# Patient Record
Sex: MALE | Race: WHITE | URBAN - METROPOLITAN AREA
[De-identification: names, ages, dates, MRNs, and addresses within clinical notes are randomized per-mention and may not be internally consistent; named-entity substitution may affect disease eponyms.]

---

## 2024-02-12 ENCOUNTER — APPOINTMENT (OUTPATIENT)
Dept: GENERAL RADIOLOGY | Age: 19
End: 2024-02-12
Payer: COMMERCIAL

## 2024-02-12 ENCOUNTER — HOSPITAL ENCOUNTER (EMERGENCY)
Age: 19
Discharge: HOME | End: 2024-02-12
Payer: COMMERCIAL

## 2024-02-12 VITALS
TEMPERATURE: 98.6 F | HEART RATE: 90 BPM | WEIGHT: 195 LBS | OXYGEN SATURATION: 96 % | SYSTOLIC BLOOD PRESSURE: 150 MMHG | DIASTOLIC BLOOD PRESSURE: 80 MMHG | RESPIRATION RATE: 16 BRPM | HEIGHT: 68 IN | BODY MASS INDEX: 29.55 KG/M2

## 2024-02-12 DIAGNOSIS — Z87.19 HISTORY OF GASTROESOPHAGEAL REFLUX (GERD): ICD-10-CM

## 2024-02-12 DIAGNOSIS — R14.2 BELCHING: Primary | ICD-10-CM

## 2024-02-12 LAB
ALBUMIN SERPL-MCNC: 4.8 G/DL (ref 3.5–5)
ALBUMIN/GLOB SERPL: 1.3 (ref 0.4–1.6)
ALP SERPL-CCNC: 120 U/L (ref 50–136)
ALT SERPL-CCNC: 32 U/L (ref 12–65)
ANION GAP SERPL CALC-SCNC: 5 MMOL/L (ref 2–11)
AST SERPL-CCNC: 32 U/L (ref 15–37)
BASOPHILS # BLD: 0 K/UL (ref 0–0.2)
BASOPHILS NFR BLD: 0 % (ref 0–2)
BILIRUB SERPL-MCNC: 0.9 MG/DL (ref 0.2–1.1)
BUN SERPL-MCNC: 14 MG/DL (ref 6–23)
CALCIUM SERPL-MCNC: 9.8 MG/DL (ref 8.3–10.4)
CHLORIDE SERPL-SCNC: 104 MMOL/L (ref 103–113)
CO2 SERPL-SCNC: 28 MMOL/L (ref 21–32)
CREAT SERPL-MCNC: 0.92 MG/DL (ref 0.8–1.5)
DIFFERENTIAL METHOD BLD: ABNORMAL
EOSINOPHIL # BLD: 0.1 K/UL (ref 0–0.8)
EOSINOPHIL NFR BLD: 1 % (ref 0.5–7.8)
ERYTHROCYTE [DISTWIDTH] IN BLOOD BY AUTOMATED COUNT: 12.6 % (ref 11.9–14.6)
GLOBULIN SER CALC-MCNC: 3.7 G/DL (ref 2.8–4.5)
GLUCOSE SERPL-MCNC: 99 MG/DL (ref 65–100)
HCT VFR BLD AUTO: 44.2 % (ref 41.1–50.3)
HGB BLD-MCNC: 14.9 G/DL (ref 13.6–17.2)
IMM GRANULOCYTES # BLD AUTO: 0 K/UL (ref 0–0.5)
IMM GRANULOCYTES NFR BLD AUTO: 0 % (ref 0–5)
LYMPHOCYTES # BLD: 2.8 K/UL (ref 0.5–4.6)
LYMPHOCYTES NFR BLD: 24 % (ref 13–44)
MCH RBC QN AUTO: 28.9 PG (ref 26.1–32.9)
MCHC RBC AUTO-ENTMCNC: 33.7 G/DL (ref 31.4–35)
MCV RBC AUTO: 85.8 FL (ref 82–102)
MONOCYTES # BLD: 0.9 K/UL (ref 0.1–1.3)
MONOCYTES NFR BLD: 7 % (ref 4–12)
NEUTS SEG # BLD: 7.9 K/UL (ref 1.7–8.2)
NEUTS SEG NFR BLD: 67 % (ref 43–78)
NRBC # BLD: 0 K/UL (ref 0–0.2)
PLATELET # BLD AUTO: 249 K/UL (ref 150–450)
PMV BLD AUTO: 10.8 FL (ref 9.4–12.3)
POTASSIUM SERPL-SCNC: 3.3 MMOL/L (ref 3.5–5.1)
PROT SERPL-MCNC: 8.5 G/DL (ref 6.3–8.2)
RBC # BLD AUTO: 5.15 M/UL (ref 4.23–5.6)
SODIUM SERPL-SCNC: 137 MMOL/L (ref 136–146)
WBC # BLD AUTO: 11.7 K/UL (ref 4.3–11.1)

## 2024-02-12 PROCEDURE — C9113 INJ PANTOPRAZOLE SODIUM, VIA: HCPCS

## 2024-02-12 PROCEDURE — 99284 EMERGENCY DEPT VISIT MOD MDM: CPT

## 2024-02-12 PROCEDURE — A4216 STERILE WATER/SALINE, 10 ML: HCPCS

## 2024-02-12 PROCEDURE — 96375 TX/PRO/DX INJ NEW DRUG ADDON: CPT

## 2024-02-12 PROCEDURE — 80053 COMPREHEN METABOLIC PANEL: CPT

## 2024-02-12 PROCEDURE — 71045 X-RAY EXAM CHEST 1 VIEW: CPT

## 2024-02-12 PROCEDURE — 6360000002 HC RX W HCPCS

## 2024-02-12 PROCEDURE — 96374 THER/PROPH/DIAG INJ IV PUSH: CPT

## 2024-02-12 PROCEDURE — 85025 COMPLETE CBC W/AUTO DIFF WBC: CPT

## 2024-02-12 PROCEDURE — 2580000003 HC RX 258

## 2024-02-12 RX ORDER — METOCLOPRAMIDE HYDROCHLORIDE 5 MG/ML
10 INJECTION INTRAMUSCULAR; INTRAVENOUS
Status: COMPLETED | OUTPATIENT
Start: 2024-02-12 | End: 2024-02-12

## 2024-02-12 RX ORDER — DIPHENHYDRAMINE HYDROCHLORIDE 50 MG/ML
25 INJECTION INTRAMUSCULAR; INTRAVENOUS
Status: COMPLETED | OUTPATIENT
Start: 2024-02-12 | End: 2024-02-12

## 2024-02-12 RX ORDER — METOCLOPRAMIDE 10 MG/1
10 TABLET ORAL 4 TIMES DAILY
Qty: 60 TABLET | Refills: 0 | Status: SHIPPED | OUTPATIENT
Start: 2024-02-12

## 2024-02-12 RX ADMIN — PANTOPRAZOLE SODIUM 40 MG: 40 INJECTION, POWDER, FOR SOLUTION INTRAVENOUS at 21:13

## 2024-02-12 RX ADMIN — METOCLOPRAMIDE 10 MG: 5 INJECTION, SOLUTION INTRAMUSCULAR; INTRAVENOUS at 21:15

## 2024-02-12 RX ADMIN — DIPHENHYDRAMINE HYDROCHLORIDE 25 MG: 50 INJECTION INTRAMUSCULAR; INTRAVENOUS at 21:14

## 2024-02-13 NOTE — ED PROVIDER NOTES
Emergency Department Provider Note       PCP: No primary care provider on file.   Age: 18 y.o.   Sex: male     DISPOSITION Decision To Discharge 02/12/2024 10:00:44 PM       ICD-10-CM    1. Belching  R14.2 Rayshawn Aguilar MD, Gastroenterology, [place]      2. History of gastroesophageal reflux (GERD)  Z87.19 Rayshawn Aguilar MD, Gastroenterology, [place]          Medical Decision Making     Complexity of Problems Addressed:  1 stable acute illness    Data Reviewed and Analyzed:  I independently ordered and reviewed each unique test.         I interpreted the X-rays no acute findings.    Discussion of management or test interpretation.  18-year-old male with history of GERD presents with uncontrollable belching occurring every 3 to 4 seconds.  Denies any associated dysphagia, nausea vomiting or abdominal pain.  Patient endorses recent EGD in December 2023 without any significant findings.  This was apparently performed in his hometown in [place].  He has recently relocated to the area.  Symptoms have been uncontrolled with omeprazole and famotidine.  He arrives hypertensive but otherwise vitally stable in no acute distress.  Observed belching every 3 seconds or so.  No vomiting.  Will check basic labs, chest x-ray and attempt symptomatic treatment.    Labs here are unremarkable.  Chest x-ray clear.  Patient did receive significant improvement after dose of IV Protonix and Reglan.  Will plan to discharge home with oral Reglan and refer to gastroenterology for follow-up.  We discussed return precautions.  Patient understanding and agreeable.            Risk of Complications and/or Morbidity of Patient Management:  Prescription drug management performed.        History       Patient is a 18-year-old male with history of GERD presenting to the emergency department for evaluation of persistent belching.  States that for the past 3 days, he has been unable to control constant belching about every 3

## 2024-02-13 NOTE — ED TRIAGE NOTES
Pt arrives A&Ox4 ambulatory. Pt reports burping since Friday w/o stopping. Pt tried meds for indigestion w/o relief.

## 2024-02-13 NOTE — DISCHARGE INSTRUCTIONS
Labs and x-ray did not show any acute abnormalities.  I have provided a prescription for Reglan to use as needed in addition to your omeprazole and famotidine.  I have placed a referral to gastroenterology for further evaluation.  Their office should call to schedule an appointment.  Please return with any worsening symptoms or concerns in the interim.

## 2024-02-13 NOTE — ED NOTES
I have reviewed discharge instructions with the patient and parent.  The patient and parent verbalized understanding.    Patient left ED via Discharge Method: ambulatory to Home with family.    Opportunity for questions and clarification provided.       Patient given 1 scripts.         To continue your aftercare when you leave the hospital, you may receive an automated call from our care team to check in on how you are doing.  This is a free service and part of our promise to provide the best care and service to meet your aftercare needs.” If you have questions, or wish to unsubscribe from this service please call [phone].  Thank you for Choosing our [facility] Emergency Department.

## 2024-02-25 ENCOUNTER — APPOINTMENT (OUTPATIENT)
Dept: ULTRASOUND IMAGING | Age: 19
End: 2024-02-25
Payer: COMMERCIAL

## 2024-02-25 ENCOUNTER — HOSPITAL ENCOUNTER (EMERGENCY)
Age: 19
Discharge: HOME | End: 2024-02-25
Payer: COMMERCIAL

## 2024-02-25 ENCOUNTER — APPOINTMENT (OUTPATIENT)
Dept: GENERAL RADIOLOGY | Age: 19
End: 2024-02-25
Payer: COMMERCIAL

## 2024-02-25 VITALS
BODY MASS INDEX: 31.83 KG/M2 | WEIGHT: 210 LBS | HEIGHT: 68 IN | RESPIRATION RATE: 22 BRPM | OXYGEN SATURATION: 98 % | DIASTOLIC BLOOD PRESSURE: 73 MMHG | HEART RATE: 111 BPM | SYSTOLIC BLOOD PRESSURE: 121 MMHG | TEMPERATURE: 98.5 F

## 2024-02-25 DIAGNOSIS — R17 ELEVATED BILIRUBIN: ICD-10-CM

## 2024-02-25 DIAGNOSIS — R79.89 ELEVATED LFTS: Primary | ICD-10-CM

## 2024-02-25 LAB
ALBUMIN SERPL-MCNC: 3.9 G/DL (ref 3.5–5)
ALBUMIN/GLOB SERPL: 0.9 (ref 0.4–1.6)
ALP SERPL-CCNC: 444 U/L (ref 50–136)
ALT SERPL-CCNC: 801 U/L (ref 12–65)
ANION GAP SERPL CALC-SCNC: 6 MMOL/L (ref 2–11)
APAP SERPL-MCNC: <3 UG/ML (ref 10–30)
APTT PPP: 31.3 SEC (ref 23.3–37.4)
AST SERPL-CCNC: 481 U/L (ref 15–37)
BASOPHILS # BLD: 0.1 K/UL (ref 0–0.2)
BASOPHILS NFR BLD: 1 % (ref 0–2)
BILIRUB SERPL-MCNC: 2.4 MG/DL (ref 0.2–1.1)
BILIRUB UR QL: ABNORMAL
BUN SERPL-MCNC: 12 MG/DL (ref 6–23)
CALCIUM SERPL-MCNC: 9.3 MG/DL (ref 8.3–10.4)
CHLORIDE SERPL-SCNC: 103 MMOL/L (ref 103–113)
CO2 SERPL-SCNC: 28 MMOL/L (ref 21–32)
CREAT SERPL-MCNC: 0.9 MG/DL (ref 0.8–1.5)
DIFFERENTIAL METHOD BLD: ABNORMAL
EOSINOPHIL # BLD: 0.1 K/UL (ref 0–0.8)
EOSINOPHIL NFR BLD: 1 % (ref 0.5–7.8)
ERYTHROCYTE [DISTWIDTH] IN BLOOD BY AUTOMATED COUNT: 13.3 % (ref 11.9–14.6)
FLUAV RNA SPEC QL NAA+PROBE: NOT DETECTED
FLUBV RNA SPEC QL NAA+PROBE: NOT DETECTED
GGT SERPL-CCNC: 389 U/L (ref 15–85)
GLOBULIN SER CALC-MCNC: 4.3 G/DL (ref 2.8–4.5)
GLUCOSE SERPL-MCNC: 152 MG/DL (ref 65–100)
GLUCOSE UR QL STRIP.AUTO: NEGATIVE MG/DL
HAV IGM SER QL: NONREACTIVE
HBV CORE IGM SER QL: NONREACTIVE
HBV SURFACE AG SER QL: NONREACTIVE
HCT VFR BLD AUTO: 43.3 % (ref 41.1–50.3)
HCV AB SER QL: NONREACTIVE
HETEROPH AB BLD QL IA: NEGATIVE
HGB BLD-MCNC: 14.7 G/DL (ref 13.6–17.2)
IMM GRANULOCYTES # BLD AUTO: 0 K/UL (ref 0–0.5)
IMM GRANULOCYTES NFR BLD AUTO: 0 % (ref 0–5)
INR PPP: 1
KETONES UR-MCNC: NEGATIVE MG/DL
LDH SERPL L TO P-CCNC: 597 U/L (ref 100–190)
LEUKOCYTE ESTERASE UR QL STRIP: NEGATIVE
LIPASE SERPL-CCNC: 142 U/L (ref 73–393)
LYMPHOCYTES # BLD: 3.8 K/UL (ref 0.5–4.6)
LYMPHOCYTES NFR BLD: 52 % (ref 13–44)
MCH RBC QN AUTO: 28.8 PG (ref 26.1–32.9)
MCHC RBC AUTO-ENTMCNC: 33.9 G/DL (ref 31.4–35)
MCV RBC AUTO: 84.9 FL (ref 82–102)
MONOCYTES # BLD: 0.4 K/UL (ref 0.1–1.3)
MONOCYTES NFR BLD: 6 % (ref 4–12)
NEUTS SEG # BLD: 2.9 K/UL (ref 1.7–8.2)
NEUTS SEG NFR BLD: 40 % (ref 43–78)
NITRITE UR QL: NEGATIVE
NRBC # BLD: 0 K/UL (ref 0–0.2)
PH UR: 6 (ref 5–9)
PLATELET # BLD AUTO: 175 K/UL (ref 150–450)
PLATELET COMMENT: ADEQUATE
PMV BLD AUTO: 12.2 FL (ref 9.4–12.3)
POTASSIUM SERPL-SCNC: 3.8 MMOL/L (ref 3.5–5.1)
PROT SERPL-MCNC: 8.2 G/DL (ref 6.3–8.2)
PROT UR QL: NEGATIVE MG/DL
PROTHROMBIN TIME: 12.9 SEC (ref 11.3–14.9)
RBC # BLD AUTO: 5.1 M/UL (ref 4.23–5.6)
RBC # UR STRIP: NEGATIVE
RBC MORPH BLD: ABNORMAL
SARS-COV-2 RDRP RESP QL NAA+PROBE: NOT DETECTED
SERVICE CMNT-IMP: ABNORMAL
SODIUM SERPL-SCNC: 137 MMOL/L (ref 136–146)
SOURCE: NORMAL
SP GR UR: 1.02 (ref 1–1.02)
STREP, MOLECULAR: NOT DETECTED
UROBILINOGEN UR QL: 1 EU/DL (ref 0.2–1)
WBC # BLD AUTO: 7.3 K/UL (ref 4.3–11.1)
WBC MORPH BLD: ABNORMAL

## 2024-02-25 PROCEDURE — 99284 EMERGENCY DEPT VISIT MOD MDM: CPT

## 2024-02-25 PROCEDURE — 76705 ECHO EXAM OF ABDOMEN: CPT

## 2024-02-25 PROCEDURE — 85730 THROMBOPLASTIN TIME PARTIAL: CPT

## 2024-02-25 PROCEDURE — 86645 CMV ANTIBODY IGM: CPT

## 2024-02-25 PROCEDURE — 87502 INFLUENZA DNA AMP PROBE: CPT

## 2024-02-25 PROCEDURE — 2580000003 HC RX 258

## 2024-02-25 PROCEDURE — 87635 SARS-COV-2 COVID-19 AMP PRB: CPT

## 2024-02-25 PROCEDURE — 71046 X-RAY EXAM CHEST 2 VIEWS: CPT

## 2024-02-25 PROCEDURE — 86308 HETEROPHILE ANTIBODY SCREEN: CPT

## 2024-02-25 PROCEDURE — 86644 CMV ANTIBODY: CPT

## 2024-02-25 PROCEDURE — 6360000002 HC RX W HCPCS

## 2024-02-25 PROCEDURE — 80143 DRUG ASSAY ACETAMINOPHEN: CPT

## 2024-02-25 PROCEDURE — 85025 COMPLETE CBC W/AUTO DIFF WBC: CPT

## 2024-02-25 PROCEDURE — 87651 STREP A DNA AMP PROBE: CPT

## 2024-02-25 PROCEDURE — 86665 EPSTEIN-BARR CAPSID VCA: CPT

## 2024-02-25 PROCEDURE — 81003 URINALYSIS AUTO W/O SCOPE: CPT

## 2024-02-25 PROCEDURE — 82977 ASSAY OF GGT: CPT

## 2024-02-25 PROCEDURE — 83690 ASSAY OF LIPASE: CPT

## 2024-02-25 PROCEDURE — 83615 LACTATE (LD) (LDH) ENZYME: CPT

## 2024-02-25 PROCEDURE — 80053 COMPREHEN METABOLIC PANEL: CPT

## 2024-02-25 PROCEDURE — 85610 PROTHROMBIN TIME: CPT

## 2024-02-25 PROCEDURE — 96361 HYDRATE IV INFUSION ADD-ON: CPT

## 2024-02-25 PROCEDURE — 96374 THER/PROPH/DIAG INJ IV PUSH: CPT

## 2024-02-25 PROCEDURE — 80074 ACUTE HEPATITIS PANEL: CPT

## 2024-02-25 RX ORDER — FEXOFENADINE HCL 180 MG/1
180 TABLET ORAL DAILY
COMMUNITY
Start: 2024-02-23 | End: 2024-03-08

## 2024-02-25 RX ORDER — ONDANSETRON 4 MG/1
4 TABLET, ORALLY DISINTEGRATING ORAL 3 TIMES DAILY PRN
Qty: 21 TABLET | Refills: 0 | Status: SHIPPED | OUTPATIENT
Start: 2024-02-25

## 2024-02-25 RX ORDER — 0.9 % SODIUM CHLORIDE 0.9 %
1000 INTRAVENOUS SOLUTION INTRAVENOUS
Status: COMPLETED | OUTPATIENT
Start: 2024-02-25 | End: 2024-02-25

## 2024-02-25 RX ORDER — BENZONATATE 100 MG/1
100-200 CAPSULE ORAL EVERY 8 HOURS
COMMUNITY
Start: 2024-02-23 | End: 2024-03-01

## 2024-02-25 RX ORDER — KETOROLAC TROMETHAMINE 15 MG/ML
15 INJECTION, SOLUTION INTRAMUSCULAR; INTRAVENOUS ONCE
Status: COMPLETED | OUTPATIENT
Start: 2024-02-25 | End: 2024-02-25

## 2024-02-25 RX ADMIN — KETOROLAC TROMETHAMINE 15 MG: 15 INJECTION, SOLUTION INTRAMUSCULAR; INTRAVENOUS at 11:02

## 2024-02-25 RX ADMIN — SODIUM CHLORIDE 1000 ML: 9 INJECTION, SOLUTION INTRAVENOUS at 11:03

## 2024-02-25 ASSESSMENT — PAIN DESCRIPTION - LOCATION: LOCATION: GENERALIZED

## 2024-02-25 ASSESSMENT — PAIN SCALES - GENERAL: PAINLEVEL_OUTOF10: 5

## 2024-02-25 ASSESSMENT — PAIN - FUNCTIONAL ASSESSMENT: PAIN_FUNCTIONAL_ASSESSMENT: 0-10

## 2024-02-25 NOTE — ED PROVIDER NOTES
Emergency Department Provider Note                   PCP:                No primary care provider on file.               Age: 18 y.o.      Sex: male     DISPOSITION Decision To Discharge 02/25/2024 03:29:17 PM       ICD-10-CM    1. Elevated LFTs  R79.89 [facility]      2. Elevated bilirubin  R17 [facility]          MEDICAL DECISION MAKING  Complexity of Problems Addressed:  Complexity of Problem: 1 acute complicated illness or injury.    Data Reviewed and Analyzed:  Category 1:   I reviewed external records: ED visit note from an outside group.  I reviewed external records: provider visit note from PCP.  I reviewed external records: provider visit note from outside specialist.  I reviewed external records: previous lab results from outside ED.  I ordered each unique test.  I reviewed the results of each unique test.      Category 2:   I interpreted the X-rays.  No pneumonia.  No pneumothorax.    Category 3: Discussion of management or test interpretation.    Patient is a 18-year-old male presenting with chills, intermittent fevers, body aches, sore throat, congestion, rhinorrhea, nausea/vomiting, and fatigue for the past 9 days.  Patient states he has tested negative for flu, strep, and COVID.      On presentation, patient is afebrile, he did not take any antipyretics thus far today and is tachycardic at 111 bpm.  O2 saturation is stable at 98% on room air.  He is speaking in full sentences without muffled voice, drooling, respiratory distress.    He has no evidence of nuchal rigidity or meningeal signs.  Posterior oropharynx is patent without any tonsillar edema, exudate, uvular deviation, or evidence of peritonsillar abscess.  Lungs are clear to auscultation in all fields without crackles, wheezes, or other adventitious sounds.  Abdomen nontender to palpation without rebound, guarding, or distention.    Will check basic lab work, repeat

## 2024-02-25 NOTE — ED NOTES
I have reviewed discharge instructions with the patient.  The patient verbalized understanding.    Patient left ED via Discharge Method: ambulatory to Home with grandpa).    Opportunity for questions and clarification provided.       Patient given 1 scripts.         To continue your aftercare when you leave the hospital, you may receive an automated call from our care team to check in on how you are doing.  This is a free service and part of our promise to provide the best care and service to meet your aftercare needs.” If you have questions, or wish to unsubscribe from this service please call [phone].  Thank you for Choosing our [facility] Emergency Department.

## 2024-02-25 NOTE — DISCHARGE INSTRUCTIONS
Please continue to stay hydrated and push fluids.  If you do experience fever or bodyaches please take ibuprofen and refrain from using Tylenol.  You can take the Zofran as needed for nausea.    Please call the gastroenterologist tomorrow to schedule a follow-up appointment.  Their number is listed above.    If you begin to experience any abdominal pain, high fevers unrelieved with ibuprofen, uncontrolled vomiting, or any new or worsening symptoms return to the ED immediately.

## 2024-02-25 NOTE — ED TRIAGE NOTES
Pt reports 9 days of chills, fever, body aches, sore throat, bilateral ear pain, nasal drainage, nausea/vomiting. Intermittent abdominal pains. Taking otc medications. Pt reports seen 3 times had negative flu, covid, mono, strep. Told was virus. Pt states not feeling any better this am so here for blood work. \"To figure out why I don't feel well.\"

## 2024-02-27 LAB
CMV IGG SERPL IA-ACNC: <0.6 U/ML (ref 0–0.59)
CMV IGM SERPL IA-ACNC: <30 AU/ML (ref 0–29.9)
EBV VCA IGG SER-ACNC: 39.9 U/ML (ref 0–17.9)
EBV VCA IGM SER-ACNC: 80.8 U/ML (ref 0–35.9)

## 2024-03-14 ENCOUNTER — OFFICE VISIT (OUTPATIENT)
Dept: GASTROENTEROLOGY | Age: 19
End: 2024-03-14
Payer: COMMERCIAL

## 2024-03-14 VITALS
SYSTOLIC BLOOD PRESSURE: 140 MMHG | TEMPERATURE: 96.9 F | HEIGHT: 68 IN | DIASTOLIC BLOOD PRESSURE: 78 MMHG | HEART RATE: 78 BPM | RESPIRATION RATE: 20 BRPM | OXYGEN SATURATION: 97 % | WEIGHT: 212 LBS | BODY MASS INDEX: 32.13 KG/M2

## 2024-03-14 DIAGNOSIS — R74.8 ELEVATED LIVER ENZYMES: Primary | ICD-10-CM

## 2024-03-14 DIAGNOSIS — K21.9 GASTROESOPHAGEAL REFLUX DISEASE WITHOUT ESOPHAGITIS: ICD-10-CM

## 2024-03-14 PROCEDURE — 99204 OFFICE O/P NEW MOD 45 MIN: CPT | Performed by: STUDENT IN AN ORGANIZED HEALTH CARE EDUCATION/TRAINING PROGRAM

## 2024-03-14 RX ORDER — OMEPRAZOLE 40 MG/1
40 CAPSULE, DELAYED RELEASE ORAL DAILY
COMMUNITY

## 2024-03-14 RX ORDER — FAMOTIDINE 40 MG/1
TABLET, FILM COATED ORAL
COMMUNITY
Start: 2021-12-13

## 2024-03-14 NOTE — PROGRESS NOTES
Mihir Kapoor is 18 y.o. y/o male here for initial evaluation.     [facility] 2/25/24:   IMPRESSION:  Hepatomegaly with no focal mass or intrahepatic biliary ductal  dilatation noted. Unremarkable otherwise    Neg hepatitis panel,   AST/ALT elevated along with t.geoffrye/ALP.           Bilirubin, Total  0.0 - 1.2 mg/dL 3.1 High    Comment: **Verified by repeat analysis**   Bilirubin, Direct  0.00 - 0.40 mg/dL 2.23 High    Alkaline Phosphatase  51 - 125 IU/L 521 High    AST  0 - 40 IU/L 273 High    ALT  0 - 44 IU/L 506 High        Total Bilirubin  0.2 - 1.1 MG/DL 2.4 High  0.9   ALT  12 - 65 U/L 801 High  32   AST  15 - 37 U/L 481 High  32   Alk Phosphatase  50 - 136 U/L 444 High  120     He is that he has chronic history of reflux disease for which he was taking omeprazole in the past however he stopped taking this medication and ended up having 3 days of burping, belching for which ended up going to the ER.  At that time IV antacid medicine cleared his episode and was given Reglan that he took 4 days however with the side effects he stopped taking Reglan.  He is currently doing omeprazole in the morning and famotidine at night and that seems to be controlling symptoms.  Otherwise later on after 4-hour drive to [place] he started feeling rundown and overall feeling body aches and fevers up to 103 that lasted up to 3 weeks.  He was evaluated in the ER had negative testing for mono, COVID or influenza.  However LFTs were also elevated had a negative hepatitis panel, he reports that he feels that he is back to baseline.  No fevers in the last couple weeks.    History reviewed. No pertinent past medical history.  History reviewed. No pertinent surgical history.  History reviewed. No pertinent family history.  Social History     Tobacco Use    Smoking status: Never    Smokeless tobacco: Never   Substance Use Topics    Alcohol use: Not Currently    Drug use: Never     Allergies   Allergen Reactions    Amoxicillin Hives

## 2024-03-15 DIAGNOSIS — R74.8 ELEVATED LIVER ENZYMES: ICD-10-CM

## 2024-03-15 LAB
IRON SATN MFR SERPL: 31 %
IRON SERPL-MCNC: 157 UG/DL (ref 35–150)
TIBC SERPL-MCNC: 508 UG/DL (ref 250–450)

## 2024-03-16 LAB
MITOCHONDRIA M2 IGG SER-ACNC: <20 UNITS (ref 0–20)
SMA IGG SER-ACNC: 3 UNITS (ref 0–19)

## 2024-03-17 LAB
ANA SER QL: NEGATIVE
CERULOPLASMIN SERPL-MCNC: 38.9 MG/DL (ref 16–31)
IGA SERPL-MCNC: 219 MG/DL (ref 90–386)
IGG SERPL-MCNC: 1062 MG/DL (ref 671–1456)
IGM SERPL-MCNC: 72 MG/DL (ref 35–168)

## 2024-03-22 ENCOUNTER — TELEPHONE (OUTPATIENT)
Dept: GASTROENTEROLOGY | Age: 19
End: 2024-03-22

## 2024-03-22 DIAGNOSIS — R74.8 ELEVATED LIVER ENZYMES: Primary | ICD-10-CM

## 2024-03-27 DIAGNOSIS — R74.8 ELEVATED LIVER ENZYMES: ICD-10-CM

## 2024-03-27 LAB
ALBUMIN SERPL-MCNC: 4.3 G/DL (ref 3.5–5)
ALBUMIN/GLOB SERPL: 1.4 (ref 0.4–1.6)
ALP SERPL-CCNC: 141 U/L (ref 50–136)
ALT SERPL-CCNC: 66 U/L (ref 12–65)
ANION GAP SERPL CALC-SCNC: 5 MMOL/L (ref 2–11)
AST SERPL-CCNC: 34 U/L (ref 15–37)
BILIRUB SERPL-MCNC: 2.2 MG/DL (ref 0.2–1.1)
BUN SERPL-MCNC: 15 MG/DL (ref 6–23)
CALCIUM SERPL-MCNC: 9.5 MG/DL (ref 8.3–10.4)
CHLORIDE SERPL-SCNC: 104 MMOL/L (ref 103–113)
CO2 SERPL-SCNC: 27 MMOL/L (ref 21–32)
CREAT SERPL-MCNC: 0.9 MG/DL (ref 0.8–1.5)
GLOBULIN SER CALC-MCNC: 3 G/DL (ref 2.8–4.5)
GLUCOSE SERPL-MCNC: 101 MG/DL (ref 65–100)
POTASSIUM SERPL-SCNC: 3.9 MMOL/L (ref 3.5–5.1)
PROT SERPL-MCNC: 7.3 G/DL (ref 6.3–8.2)
SODIUM SERPL-SCNC: 136 MMOL/L (ref 136–146)

## 2024-03-28 ENCOUNTER — TELEPHONE (OUTPATIENT)
Dept: GASTROENTEROLOGY | Age: 19
End: 2024-03-28

## 2024-03-28 NOTE — TELEPHONE ENCOUNTER
Called and informed pt of their improved lab results. Informed pt that due to continued improvement, Dr. Waddell does not want to proceed with liver biopsy at this time. Informed pt that he would instead, like to have a repeat CMP done in 1 month to ensure labs normalize. Pt verbalized understanding.    ----- Message from Marcos Waddell MD sent at 3/28/2024  7:18 AM EDT -----  His numbers continue to get better, we will not proceed with liver biopsy at this time. Repeat CMP in 1 month to make sure they normalize.